# Patient Record
(demographics unavailable — no encounter records)

---

## 2024-12-23 NOTE — RESULTS/DATA
[FreeTextEntry1] : CBC:   	Test  	Result  	Flag	Reference	Goal	Last Verified  	WBC	7.09 K/mm3	 	3.8-10.5		REQUIRED  	RED BLOOD COUNT	4.40 M/ul	 	3.80-5.20		REQUIRED  	HEMOGLOBIN	13.7 g/dL	 	11.5-15.5		REQUIRED  	HEMATOCRIT	40.2 %	 	34.5-45.0		REQUIRED  	MEAN CORPUSCULAR VOLUME	91.4 fL	 	80.0-100.0		REQUIRED  	MEAN CORPUSCULAR HEMOGLOBIN	31.1 pg	 	27.0-34.0		REQUIRED  	MEAN CORPUSCULAR HGB CONC	34.1 g/dl	 	32.0-36.0		REQUIRED  	RED CELL DISTRIBUTION WIDTH	12.9 %	 	10.3-14.5		REQUIRED  	PLATELET COUNT	270 K/uL	 	150-400		REQUIRED  	NEUTROPHILS %	64.3 %	 	43.0-77.0		REQUIRED  	 Differential percentages must be correlated with absolute numbers for clinical significance.    	LYMPHOCYTES %	23.3 %	 	13.0-44.0		REQUIRED  	 Differential percentages must be correlated with absolute numbers for clinical significance.    	MONOCYTES %	9.4 %	 	2.0-14.0		REQUIRED  	 Differential percentages must be correlated with absolute numbers for clinical significance.    	EOSINOPHILS %	2.1 %	 	0.0-6.0		REQUIRED  	 Differential percentages must be correlated with absolute numbers for clinical significance.    	BASOPHILS %	0.6 %	 	0.0-2.0		REQUIRED  	 Differential percentages must be correlated with absolute numbers for clinical significance.    	IMMATURE GRANULOCYTES	0.3 %	 	0.1-0.9		REQUIRED  	AUTOMATED NEUTROPHILS #	4.6 K/uL	 	1.8-7.4		REQUIRED  	LYMPHOCYTES #	1.7 K/uL	 	1.0-3.3		REQUIRED  	MONOCYTES #	0.7 K/uL	 	0.0-0.9		REQUIRED  	EOSINOPHILS #	0.2 K/uL	 	0.0-0.5		REQUIRED  	BASOPHILS #	0.0 K/uL	 	0.0-0.2		REQUIRED  	NUCLEATED RBC	0 %	 	0		REQUIRED

## 2024-12-23 NOTE — REVIEW OF SYSTEMS
[FreeTextEntry9] : hands [Diarrhea: Grade 0] : Diarrhea: Grade 0 [Joint Pain] : no joint pain [Negative] : Allergic/Immunologic

## 2024-12-23 NOTE — PHYSICAL EXAM
[Restricted in physically strenuous activity but ambulatory and able to carry out work of a light or sedentary nature] : Status 1- Restricted in physically strenuous activity but ambulatory and able to carry out work of a light or sedentary nature, e.g., light house work, office work [Normal] : grossly intact [de-identified] : dense breasts. No palpable masses. well healed lumpectomy scar on right breast.

## 2024-12-23 NOTE — HISTORY OF PRESENT ILLNESS
[Disease: _____________________] : Disease: [unfilled] [AJCC Stage: ____] : AJCC Stage: [unfilled] [de-identified] : 79 year old female being seen for a Status post right breast partial mastectomy and sentinel lymph node biopsy performed on February 23, 2023.  The patient is a postmenopausal white female of [place] descent with no family history of breast or ovarian cancer who was found to have an abnormal screening mammography in December 2022 with a suspicious 1.5 cm 12:00 density seen under ultrasound.   Ultrasound core biopsy showed a classical type invasive lobular cancer which was ER/DE positive HER2 negative by IHC  MRI was performed on February 2, 2023 showing the localized cancer in the right breast 12:00 region and she underwent a right breast partial mastectomy with Hologic localization and sentinel lymph node biopsy on February 23, 2023.   Final pathology showed a 1.5 cm classical type intermediate grade ILC with free margins and 2 negative sentinel lymph nodes making this a pathologic prognostic stage IA breast cancer.   [de-identified] : 12/23/24- here for follow up. Denies any complaints. Feels overall well.  No complaints.   No pain- appetite good.  Enjoying life.   Reports having had a ghazal and US on 12/12/24, which was negative.   Continues to take anastrozole without issues   [0 - No Distress] : Distress Level: 0 [ECOG Performance Status: 1 - Restricted in physically strenuous activity but ambulatory and able to carry out work of a light or sedentary nature] : Performance Status: 1 - Restricted in physically strenuous activity but ambulatory and able to carry out work of a light or sedentary nature, e.g., light house work, office work

## 2024-12-23 NOTE — PHYSICAL EXAM
[Restricted in physically strenuous activity but ambulatory and able to carry out work of a light or sedentary nature] : Status 1- Restricted in physically strenuous activity but ambulatory and able to carry out work of a light or sedentary nature, e.g., light house work, office work [Normal] : grossly intact [de-identified] : dense breasts. No palpable masses. well healed lumpectomy scar on right breast.

## 2024-12-23 NOTE — DISEASE MANAGEMENT
[Pathological] : TNM Stage: p [IA] : IA [TTNM] : 1c [NTNM] : 0 [MTNM] : 0 [de-identified] : completed 16/16 fractions to a dose of 4240 cGy right breast finished 5/9/23

## 2024-12-23 NOTE — PHYSICAL EXAM
[] : no respiratory distress [Respiration, Rhythm And Depth] : normal respiratory rhythm and effort [Exaggerated Use Of Accessory Muscles For Inspiration] : no accessory muscle use [Abdomen Soft] : soft [Nondistended] : nondistended [Abdomen Tenderness] : non-tender [Supraclavicular Lymph Nodes Enlarged Bilaterally] : supraclavicular [Axillary Lymph Nodes Enlarged Bilaterally] : axillary [Normal] : oriented to person, place and time, the affect was normal, the mood was normal and not anxious [de-identified] : symmetric in size; there is a small soft tissue defect/dimpling at the site of the tumor bed in the right breast; the right breast feels slightly more firm and less supple than the left breast c/w post-Tx effect [de-identified] : no skin changes w/in RT field

## 2024-12-23 NOTE — REVIEW OF SYSTEMS
[Negative] : Allergic/Immunologic [Fatigue: Grade 0] : Fatigue: Grade 0 [Pruritus: Grade 0] : Pruritus: Grade 0 [Skin Hyperpigmentation: Grade 0] : Skin Hyperpigmentation: Grade 0 [Dermatitis Radiation: Grade 0] : Dermatitis Radiation: Grade 0 [Breast Pain: Grade 0] : Breast Pain: Grade 0

## 2024-12-23 NOTE — PHYSICAL EXAM
[] : no respiratory distress [Respiration, Rhythm And Depth] : normal respiratory rhythm and effort [Exaggerated Use Of Accessory Muscles For Inspiration] : no accessory muscle use [Abdomen Soft] : soft [Nondistended] : nondistended [Abdomen Tenderness] : non-tender [Supraclavicular Lymph Nodes Enlarged Bilaterally] : supraclavicular [Axillary Lymph Nodes Enlarged Bilaterally] : axillary [Normal] : oriented to person, place and time, the affect was normal, the mood was normal and not anxious [de-identified] : symmetric in size; there is a small soft tissue defect/dimpling at the site of the tumor bed in the right breast; the right breast feels slightly more firm and less supple than the left breast c/w post-Tx effect [de-identified] : no skin changes w/in RT field

## 2024-12-23 NOTE — HISTORY OF PRESENT ILLNESS
[Disease: _____________________] : Disease: [unfilled] [AJCC Stage: ____] : AJCC Stage: [unfilled] [de-identified] : 79 year old female being seen for a Status post right breast partial mastectomy and sentinel lymph node biopsy performed on February 23, 2023.  The patient is a postmenopausal white female of [place] descent with no family history of breast or ovarian cancer who was found to have an abnormal screening mammography in December 2022 with a suspicious 1.5 cm 12:00 density seen under ultrasound.   Ultrasound core biopsy showed a classical type invasive lobular cancer which was ER/MD positive HER2 negative by IHC  MRI was performed on February 2, 2023 showing the localized cancer in the right breast 12:00 region and she underwent a right breast partial mastectomy with Hologic localization and sentinel lymph node biopsy on February 23, 2023.   Final pathology showed a 1.5 cm classical type intermediate grade ILC with free margins and 2 negative sentinel lymph nodes making this a pathologic prognostic stage IA breast cancer.   [de-identified] : 12/23/24- here for follow up. Denies any complaints. Feels overall well.  No complaints.   No pain- appetite good.  Enjoying life.   Reports having had a ghazal and US on 12/12/24, which was negative.   Continues to take anastrozole without issues   [0 - No Distress] : Distress Level: 0 [ECOG Performance Status: 1 - Restricted in physically strenuous activity but ambulatory and able to carry out work of a light or sedentary nature] : Performance Status: 1 - Restricted in physically strenuous activity but ambulatory and able to carry out work of a light or sedentary nature, e.g., light house work, office work

## 2024-12-23 NOTE — HISTORY OF PRESENT ILLNESS
[FreeTextEntry1] : Ms. Batista is a 79-year-old female with Stage IA IDC of the right breast s/p PMx with negative margins and negative SLNBx. She completed 16/16 fractions to a dose of 4240 cGy right breast finished 5/9/23, presenting today for follow-up.  She started Anastrozole on 6/16/23 and is doing very well on it, she is scheduled for a follow-up with Dr. Lewis today.  Maris/Sono due 12/12/2024 revealed no mammographic or sonographic evidence of malignancy.  She continues to follow-up with Dr. Rouse whom she will be seeing in February 2025.  12/23/2024 Ms. Batista presents today for her follow up.  She denies any issues/concerns or any breast pain. She is doing well overall.

## 2024-12-23 NOTE — DISEASE MANAGEMENT
[Pathological] : TNM Stage: p [IA] : IA [TTNM] : 1c [NTNM] : 0 [MTNM] : 0 [de-identified] : completed 16/16 fractions to a dose of 4240 cGy right breast finished 5/9/23

## 2024-12-23 NOTE — ASSESSMENT
[FreeTextEntry1] : pT1c, N0 ILC, G2, s/p lumpectomy/SLNBx on 2/23/23 Stage IA  Low risk mammaprint--luminal A  Invitae neg.  s/p RT --5/9/23  anastrozole 6/16/23  BMD: 3/28/23 --osteopenia  Seen by Ayesha in 4/2024 who recommended repeat DEXA in 2 years and determine if needs bisphosphonate at that time   Plan: >continue anastrozole 1 mg daily. Refill sent >continiue oscal _+ D >mammo/sono annually >colonoscopy -- due --arrange via PMD >continue to follow up with Dr. Rouse  >follow up with endocrinology   RTC 4 months.

## 2025-01-13 NOTE — REASON FOR VISIT
[Follow-Up: _____] : a [unfilled] follow-up visit [FreeTextEntry1] : The patient is a postmenopausal white female of [place] descent with no family history of breast or ovarian cancer who was found to have an abnormal screening mammography in December 2022 with a suspicious 1.5 cm 12:00 density seen under ultrasound.  Ultrasound core biopsy showed a classical type invasive lobular cancer which was ER/PA positive HER2 negative by IHC.  MRI was performed on February 2, 2023 showing the localized cancer in the right breast 12:00 region and she underwent a right breast partial mastectomy with Hologic localization and sentinel lymph node biopsy on February 23, 2023.  Final pathology showed a 1.5 cm classical type intermediate grade invasive lobular cancer with free margins and 2 negative sentinel lymph nodes making this a pathologic prognostic stage IA breast cancer.  She underwent external beam radiation with Dr. Casas and was placed on Arimidex by Dr. Helms.  She comes in now for routine follow-up. [FreeTextEntry2] : February 23, 2023

## 2025-01-13 NOTE — HISTORY OF PRESENT ILLNESS
[FreeTextEntry1] : The patient is a 79-year-old G1, P1 postmenopausal white female of [place] and [place] descent.  She underwent menarche at age 12 and had her first child at age 31.  She underwent menopause in her late 50s and never took any hormone replacement therapy.  She has no family history of breast or ovarian cancer.  Her brother passed away from esophageal cancer and her maternal grandfather had leukemia.  She has a past medical history significant for A. fib for which she is on Eliquis and she also has hypertension and prediabetes for which she takes metformin.  She underwent a screening mammography and ultrasound at [facility] on December 10, 2022 which showed a vague but spiculated density in the right breast 12:00 region which persisted on compression views and was seen as an irregular density 11 cm from the nipple on ultrasound measuring 1.5 cm.  Ultrasound-guided core biopsy was performed on January 23, 2023 which showed a classical type invasive lobular cancer which was 11 cm from the nipple and was ER strongly positive at 100%, LA moderately positive at 5%, HER2/carla negative by IHC with a Ki-67 of 20%.  She underwent genetic panel testing in April 2023 which was negative.  MRI was performed on February 2, 2023 at [place] showing the known cancer in the right breast slightly upper inner quadrant measuring 1.8 x 1.7 x 8.9 cm with some slight enhancement superior and anterior with a 5 mm density.  This separate finding was close to the primary cancer and she underwent a Hologic localization and then a partial mastectomy and sentinel lymph node biopsy on February 23, 2023.   The final pathology showed a 1.5 cm classical type intermediate grade invasive lobular cancer with free margins and 2 negative right axillary sentinel lymph nodes making this a pathologic prognostic stage IA breast cancer.  She was seen by Dr. Helms and Dr. Casas and received a 3-week course of external beam radiation which finished in May 2023 and she was placed on Arimidex and she now follows up with Dr. Lewis.  She comes in now for routine follow-up.

## 2025-01-13 NOTE — PHYSICAL EXAM
[Normocephalic] : normocephalic [Atraumatic] : atraumatic [EOMI] : extra ocular movement intact [Supple] : supple [No Supraclavicular Adenopathy] : no supraclavicular adenopathy [No Cervical Adenopathy] : no cervical adenopathy [Examined in the supine and seated position] : examined in the supine and seated position [No dominant masses] : no dominant masses in right breast  [No dominant masses] : no dominant masses left breast [No Nipple Retraction] : no left nipple retraction [No Nipple Discharge] : no left nipple discharge [Breast Mass Right Breast ___cm] : no masses [Breast Mass Left Breast ___cm] : no masses [Breast Nipple Inversion] : nipples not inverted [Breast Nipple Retraction] : nipples not retracted [Breast Nipple Flattening] : nipples not flattened [Breast Nipple Fissures] : nipples not fissured [Breast Abnormal Lactation (Galactorrhea)] : no galactorrhea [Breast Abnormal Secretion Bloody Fluid] : no bloody discharge [Breast Abnormal Secretion Serous Fluid] : no serous discharge [Breast Abnormal Secretion Opalescent Fluid] : no milky discharge [No Axillary Lymphadenopathy] : no left axillary lymphadenopathy [No Edema] : no edema [No Rashes] : no rashes [No Ulceration] : no ulceration [de-identified] : On exam, the patient is doing well after her right breast partial mastectomy and sentinel lymph node biopsy.  Her wound is healing well with no signs of any hematoma or infection.  I did aspirate about 7 cc's of bloody seroma from underneath the wide excision site today. [de-identified] : Status post right breast partial mastectomy and wound is healing well.  I aspirated 7 cc's of bloody seroma from underneath the wide excision site under ultrasound guidance in the office today.

## 2025-01-13 NOTE — ASSESSMENT
[FreeTextEntry1] : The patient is a 79-year-old G1, P1 postmenopausal white female of [place] and [place] descent. She underwent menarche at age 12 and had her first child at age 31. She underwent menopause in her late 50s and never took any hormone replacement therapy. She has no family history of breast or ovarian cancer. Her brother passed away from esophageal cancer and her maternal grandfather had leukemia. She has a past medical history significant for A. fib for which she is on Eliquis and she also has hypertension and prediabetes for which she takes metformin. She underwent a screening mammography and ultrasound at [facility] on December 10, 2022 which showed a vague but spiculated density in the right breast 12:00 region which persisted on compression views and was seen as an irregular density 11 cm from the nipple on ultrasound measuring 1.5 cm. Ultrasound-guided core biopsy was performed on January 23, 2023 which showed a classical type invasive lobular cancer which was 11 cm from the nipple and was ER strongly positive at 100%, SD moderately positive at 5%, HER2/carla negative by IHC with a Ki-67 of 20%. She had a "coil" clip placed at the biopsy site. I reviewed her imaging from [facility] which she brought in on CD-ROM. This was a fairly vague area seen on mammography but easily seen on ultrasound measuring 1.5 cm. Pathology was reviewed from [facility] showing a classical type invasive lobular cancer. She underwent genetic panel testing in April 2023 which was negative.. MRI was performed on February 2, 2023 at [place] showing the known cancer in the right breast slightly upper inner quadrant measuring 1.8 x 1.7 x 8.9 cm with some slight enhancement superior and anterior with a 5 mm density. This separate finding was close to the primary cancer and she underwent a Hologic localization and then a partial mastectomy and sentinel lymph node biopsy on February 23, 2023. She did have a cardiology consult with her cardiologist, Nasrin Bush, preoperatively and was taken off her Eliquis prior to surgery. The final pathology showed a 1.5 cm classical type intermediate grade invasive lobular cancer with free margins and 2 negative right axillary sentinel lymph nodes making this a pathologic prognostic stage IA breast cancer. She was seen by Dr. Helms and Dr. Casas postoperatively and received a 3-week course of external beam radiation to the right breast which finished on May 9, 2023 and she was then placed on anastrozole.  She underwent her last bilateral mammography and ultrasound which was reviewed from December 12, 2024 and performed at [facility] which showed postsurgical changes in the right breast but no suspicious findings.  On exam today, she has healed well from her right breast partial mastectomy and sentinel lymph node biopsy and has no evidence of recurrence. I would like to see her again in another 6 months around August 2025. Her next routine bilateral mammography and ultrasound will be due again in December 2025 and she was given prescriptions.  She will remain on Arimidex and she is now following up with Dr. Lewis since Dr. Helms has left.

## 2025-01-13 NOTE — ADDENDUM
[FreeTextEntry1] : I spent greater than 75% in consultation in face-to-face counseling and coordination of care in this patient with a history of a right breast cancer who underwent a partial mastectomy and comes in now for routine breast cancer screening/surveillance.

## 2025-02-04 NOTE — ADDENDUM
[FreeTextEntry1] : I spent greater than 75% in consultation in face-to-face counseling and coordination of care in this patient with a history of a right breast cancer who underwent a partial mastectomy and comes in now for routine breast cancer screening/surveillance. 04-Jan-2025 14:36

## 2025-02-04 NOTE — PHYSICAL EXAM
[Normocephalic] : normocephalic [Atraumatic] : atraumatic [EOMI] : extra ocular movement intact [Supple] : supple [No Supraclavicular Adenopathy] : no supraclavicular adenopathy [No Cervical Adenopathy] : no cervical adenopathy [Examined in the supine and seated position] : examined in the supine and seated position [No dominant masses] : no dominant masses in right breast  [No dominant masses] : no dominant masses left breast [No Nipple Retraction] : no left nipple retraction [No Nipple Discharge] : no left nipple discharge [Breast Mass Right Breast ___cm] : no masses [Breast Mass Left Breast ___cm] : no masses [Breast Nipple Inversion] : nipples not inverted [Breast Nipple Retraction] : nipples not retracted [Breast Nipple Flattening] : nipples not flattened [Breast Nipple Fissures] : nipples not fissured [Breast Abnormal Lactation (Galactorrhea)] : no galactorrhea [Breast Abnormal Secretion Bloody Fluid] : no bloody discharge [Breast Abnormal Secretion Serous Fluid] : no serous discharge [Breast Abnormal Secretion Opalescent Fluid] : no milky discharge [No Axillary Lymphadenopathy] : no left axillary lymphadenopathy [No Edema] : no edema [No Rashes] : no rashes [No Ulceration] : no ulceration [de-identified] : On exam, the patient is doing well after her right breast partial mastectomy and sentinel lymph node biopsy.  Her wound is healing well with no signs of any hematoma or infection.  I did aspirate about 7 cc's of bloody seroma from underneath the wide excision site today. [de-identified] : Status post right breast partial mastectomy and wound is healing well.  I aspirated 7 cc's of bloody seroma from underneath the wide excision site under ultrasound guidance in the office today.

## 2025-02-04 NOTE — REASON FOR VISIT
[Follow-Up: _____] : a [unfilled] follow-up visit [FreeTextEntry1] : The patient is a postmenopausal white female of [place] descent with no family history of breast or ovarian cancer who was found to have an abnormal screening mammography in December 2022 with a suspicious 1.5 cm 12:00 density seen under ultrasound.  Ultrasound core biopsy showed a classical type invasive lobular cancer which was ER/IA positive HER2 negative by IHC.  MRI was performed on February 2, 2023 showing the localized cancer in the right breast 12:00 region and she underwent a right breast partial mastectomy with Hologic localization and sentinel lymph node biopsy on February 23, 2023.  Final pathology showed a 1.5 cm classical type intermediate grade invasive lobular cancer with free margins and 2 negative sentinel lymph nodes making this a pathologic prognostic stage IA breast cancer.  She underwent external beam radiation with Dr. Casas and was placed on Arimidex by Dr. Helms.  She comes in now for routine follow-up. [FreeTextEntry2] : February 23, 2023

## 2025-02-04 NOTE — HISTORY OF PRESENT ILLNESS
[FreeTextEntry1] : The patient is a 79-year-old G1, P1 postmenopausal white female of Michelle and [place] descent.  She underwent menarche at age 12 and had her first child at age 31.  She underwent menopause in her late 50s and never took any hormone replacement therapy.  She has no family history of breast or ovarian cancer.  Her brother passed away from esophageal cancer and her maternal grandfather had leukemia.  She has a past medical history significant for A. fib for which she is on Eliquis and she also has hypertension and prediabetes for which she takes metformin.  She underwent a screening mammography and ultrasound at [facility] on December 10, 2022 which showed a vague but spiculated density in the right breast 12:00 region which persisted on compression views and was seen as an irregular density 11 cm from the nipple on ultrasound measuring 1.5 cm.  Ultrasound-guided core biopsy was performed on January 23, 2023 which showed a classical type invasive lobular cancer which was 11 cm from the nipple and was ER strongly positive at 100%, LA moderately positive at 5%, HER2/carla negative by IHC with a Ki-67 of 20%.  She underwent genetic panel testing in April 2023 which was negative.  MRI was performed on February 2, 2023 at [place] showing the known cancer in the right breast slightly upper inner quadrant measuring 1.8 x 1.7 x 8.9 cm with some slight enhancement superior and anterior with a 5 mm density.  This separate finding was close to the primary cancer and she underwent a Hologic localization and then a partial mastectomy and sentinel lymph node biopsy on February 23, 2023.   The final pathology showed a 1.5 cm classical type intermediate grade invasive lobular cancer with free margins and 2 negative right axillary sentinel lymph nodes making this a pathologic prognostic stage IA breast cancer.  She was seen by Dr. Helms and Dr. Casas and received a 3-week course of external beam radiation which finished in May 2023 and she was placed on Arimidex and she now follows up with Dr. Lewis.  She comes in now for routine follow-up.

## 2025-02-04 NOTE — REASON FOR VISIT
[Follow-Up: _____] : a [unfilled] follow-up visit [FreeTextEntry1] : The patient is a postmenopausal white female of [place] descent with no family history of breast or ovarian cancer who was found to have an abnormal screening mammography in December 2022 with a suspicious 1.5 cm 12:00 density seen under ultrasound.  Ultrasound core biopsy showed a classical type invasive lobular cancer which was ER/IA positive HER2 negative by IHC.  MRI was performed on February 2, 2023 showing the localized cancer in the right breast 12:00 region and she underwent a right breast partial mastectomy with Hologic localization and sentinel lymph node biopsy on February 23, 2023.  Final pathology showed a 1.5 cm classical type intermediate grade invasive lobular cancer with free margins and 2 negative sentinel lymph nodes making this a pathologic prognostic stage IA breast cancer.  She underwent external beam radiation with Dr. Casas and was placed on Arimidex by Dr. Helms.  She comes in now for routine follow-up. [FreeTextEntry2] : February 23, 2023

## 2025-02-04 NOTE — ADDENDUM
[FreeTextEntry1] : I spent greater than 75% in consultation in face-to-face counseling and coordination of care in this patient with a history of a right breast cancer who underwent a partial mastectomy and comes in now for routine breast cancer screening/surveillance. clear

## 2025-02-04 NOTE — HISTORY OF PRESENT ILLNESS
[FreeTextEntry1] : The patient is a 79-year-old G1, P1 postmenopausal white female of Michelle and [place] descent.  She underwent menarche at age 12 and had her first child at age 31.  She underwent menopause in her late 50s and never took any hormone replacement therapy.  She has no family history of breast or ovarian cancer.  Her brother passed away from esophageal cancer and her maternal grandfather had leukemia.  She has a past medical history significant for A. fib for which she is on Eliquis and she also has hypertension and prediabetes for which she takes metformin.  She underwent a screening mammography and ultrasound at [facility] on December 10, 2022 which showed a vague but spiculated density in the right breast 12:00 region which persisted on compression views and was seen as an irregular density 11 cm from the nipple on ultrasound measuring 1.5 cm.  Ultrasound-guided core biopsy was performed on January 23, 2023 which showed a classical type invasive lobular cancer which was 11 cm from the nipple and was ER strongly positive at 100%, HI moderately positive at 5%, HER2/carla negative by IHC with a Ki-67 of 20%.  She underwent genetic panel testing in April 2023 which was negative.  MRI was performed on February 2, 2023 at [place] showing the known cancer in the right breast slightly upper inner quadrant measuring 1.8 x 1.7 x 8.9 cm with some slight enhancement superior and anterior with a 5 mm density.  This separate finding was close to the primary cancer and she underwent a Hologic localization and then a partial mastectomy and sentinel lymph node biopsy on February 23, 2023.   The final pathology showed a 1.5 cm classical type intermediate grade invasive lobular cancer with free margins and 2 negative right axillary sentinel lymph nodes making this a pathologic prognostic stage IA breast cancer.  She was seen by Dr. Helms and Dr. Casas and received a 3-week course of external beam radiation which finished in May 2023 and she was placed on Arimidex and she now follows up with Dr. Lewis.  She comes in now for routine follow-up.

## 2025-02-04 NOTE — ASSESSMENT
[FreeTextEntry1] : The patient is a 79-year-old G1, P1 postmenopausal white female of [place] and [place] descent. She underwent menarche at age 12 and had her first child at age 31. She underwent menopause in her late 50s and never took any hormone replacement therapy. She has no family history of breast or ovarian cancer. Her brother passed away from esophageal cancer and her maternal grandfather had leukemia. She has a past medical history significant for A. fib for which she is on Eliquis and she also has hypertension and prediabetes for which she takes metformin. She underwent a screening mammography and ultrasound at [facility] on December 10, 2022 which showed a vague but spiculated density in the right breast 12:00 region which persisted on compression views and was seen as an irregular density 11 cm from the nipple on ultrasound measuring 1.5 cm. Ultrasound-guided core biopsy was performed on January 23, 2023 which showed a classical type invasive lobular cancer which was 11 cm from the nipple and was ER strongly positive at 100%, CO moderately positive at 5%, HER2/carla negative by IHC with a Ki-67 of 20%. She had a "coil" clip placed at the biopsy site. I reviewed her imaging from [facility] which she brought in on CD-ROM. This was a fairly vague area seen on mammography but easily seen on ultrasound measuring 1.5 cm. Pathology was reviewed from [facility] showing a classical type invasive lobular cancer. She underwent genetic panel testing in April 2023 which was negative.. MRI was performed on February 2, 2023 at [place] showing the known cancer in the right breast slightly upper inner quadrant measuring 1.8 x 1.7 x 8.9 cm with some slight enhancement superior and anterior with a 5 mm density. This separate finding was close to the primary cancer and she underwent a Hologic localization and then a partial mastectomy and sentinel lymph node biopsy on February 23, 2023. She did have a cardiology consult with her cardiologist, Nasrin Bush, preoperatively and was taken off her Eliquis prior to surgery. The final pathology showed a 1.5 cm classical type intermediate grade invasive lobular cancer with free margins and 2 negative right axillary sentinel lymph nodes making this a pathologic prognostic stage IA breast cancer. She was seen by Dr. Helms and Dr. Cassa postoperatively and received a 3-week course of external beam radiation to the right breast which finished on May 9, 2023 and she was then placed on anastrozole.  She underwent her last bilateral mammography and ultrasound which was reviewed from December 12, 2024 and performed at [facility] which showed postsurgical changes in the right breast but no suspicious findings.  On exam today, she has healed well from her right breast partial mastectomy and sentinel lymph node biopsy and has no evidence of recurrence. I would like to see her again in another 6 months around August 2025. Her next routine bilateral mammography and ultrasound will be due again in December 2025 and she was given prescriptions.  She will remain on Arimidex and she is now following up with Dr. Lewis since Dr. Helms has left. Pt remained hemodynamically stable.  Pt denies any pain or shortness of breath. Pt being discharged to home. Pt understands all discharge instructions and medication.

## 2025-02-04 NOTE — PHYSICAL EXAM
[Normocephalic] : normocephalic [Atraumatic] : atraumatic [EOMI] : extra ocular movement intact [Supple] : supple [No Supraclavicular Adenopathy] : no supraclavicular adenopathy [No Cervical Adenopathy] : no cervical adenopathy [Examined in the supine and seated position] : examined in the supine and seated position [No dominant masses] : no dominant masses in right breast  [No dominant masses] : no dominant masses left breast [No Nipple Retraction] : no left nipple retraction [No Nipple Discharge] : no left nipple discharge [Breast Mass Right Breast ___cm] : no masses [Breast Mass Left Breast ___cm] : no masses [Breast Nipple Inversion] : nipples not inverted [Breast Nipple Retraction] : nipples not retracted [Breast Nipple Flattening] : nipples not flattened [Breast Nipple Fissures] : nipples not fissured [Breast Abnormal Lactation (Galactorrhea)] : no galactorrhea [Breast Abnormal Secretion Bloody Fluid] : no bloody discharge [Breast Abnormal Secretion Serous Fluid] : no serous discharge [Breast Abnormal Secretion Opalescent Fluid] : no milky discharge [No Axillary Lymphadenopathy] : no left axillary lymphadenopathy [No Edema] : no edema [No Rashes] : no rashes [No Ulceration] : no ulceration [de-identified] : On exam, the patient is doing well after her right breast partial mastectomy and sentinel lymph node biopsy.  Her wound is healing well with no signs of any hematoma or infection.  I did aspirate about 7 cc's of bloody seroma from underneath the wide excision site today. [de-identified] : Status post right breast partial mastectomy and wound is healing well.  I aspirated 7 cc's of bloody seroma from underneath the wide excision site under ultrasound guidance in the office today.

## 2025-04-22 NOTE — PHYSICAL EXAM
[Restricted in physically strenuous activity but ambulatory and able to carry out work of a light or sedentary nature] : Status 1- Restricted in physically strenuous activity but ambulatory and able to carry out work of a light or sedentary nature, e.g., light house work, office work [Normal] : grossly intact [de-identified] : dense breasts. No palpable masses. well healed lumpectomy scar on right breast.

## 2025-04-22 NOTE — PHYSICAL EXAM
[Restricted in physically strenuous activity but ambulatory and able to carry out work of a light or sedentary nature] : Status 1- Restricted in physically strenuous activity but ambulatory and able to carry out work of a light or sedentary nature, e.g., light house work, office work [Normal] : grossly intact [de-identified] : dense breasts. No palpable masses. well healed lumpectomy scar on right breast.

## 2025-04-22 NOTE — REVIEW OF SYSTEMS
[Diarrhea: Grade 0] : Diarrhea: Grade 0 [Negative] : Allergic/Immunologic [Hot Flashes] : hot flashes [Joint Pain] : no joint pain

## 2025-04-22 NOTE — HISTORY OF PRESENT ILLNESS
[Disease: _____________________] : Disease: [unfilled] [AJCC Stage: ____] : AJCC Stage: [unfilled] [0 - No Distress] : Distress Level: 0 [ECOG Performance Status: 1 - Restricted in physically strenuous activity but ambulatory and able to carry out work of a light or sedentary nature] : Performance Status: 1 - Restricted in physically strenuous activity but ambulatory and able to carry out work of a light or sedentary nature, e.g., light house work, office work [de-identified] : 79 year old female being seen for a Status post right breast partial mastectomy and sentinel lymph node biopsy performed on February 23, 2023.  The patient is a postmenopausal white female of [place] descent with no family history of breast or ovarian cancer who was found to have an abnormal screening mammography in December 2022 with a suspicious 1.5 cm 12:00 density seen under ultrasound.   Ultrasound core biopsy showed a classical type invasive lobular cancer which was ER/AL positive HER2 negative by IHC  MRI was performed on February 2, 2023 showing the localized cancer in the right breast 12:00 region and she underwent a right breast partial mastectomy with Hologic localization and sentinel lymph node biopsy on February 23, 2023.   Final pathology showed a 1.5 cm classical type intermediate grade ILC with free margins and 2 negative sentinel lymph nodes making this a pathologic prognostic stage IA breast cancer.   [de-identified] : 4/22/25 Denies any complaints. Feels overall well.  No complaints.   No pain- appetite good.  Enjoying life.   Reports having had a ghazal and US on 12/12/24, which was negative.   Continues to take anastrozole without issues

## 2025-04-22 NOTE — ASSESSMENT
[FreeTextEntry1] : pT1c, N0 ILC, G2, s/p lumpectomy/SLNBx on 2/23/23 Stage IA  Low risk mammaprint--luminal A  Invitae neg.  s/p RT --5/9/23  anastrozole 6/16/23  BMD: 3/28/23 --osteopenia  Seen by Endo in 4/2024 who recommended repeat DEXA in 2 years and determine if needs bisphosphonate at that time  Plan: >continue anastrozole 1 mg daily. Refill sent >continiue oscal _+ D >mammo/sono annually- next due Dec 2025 >repeat DEXA scan. last in 2023 w/ osteoporosis. ordered by Endo and to be scheduled. recommend Bisphophonate. Looking into Reclast with Endo >continue to follow up with Dr. Rouse  >follow up with endocrinology >colonoscopy -- due --arrange via PMD  RTC 4 months.

## 2025-04-22 NOTE — HISTORY OF PRESENT ILLNESS
[Disease: _____________________] : Disease: [unfilled] [AJCC Stage: ____] : AJCC Stage: [unfilled] [0 - No Distress] : Distress Level: 0 [ECOG Performance Status: 1 - Restricted in physically strenuous activity but ambulatory and able to carry out work of a light or sedentary nature] : Performance Status: 1 - Restricted in physically strenuous activity but ambulatory and able to carry out work of a light or sedentary nature, e.g., light house work, office work [de-identified] : 79 year old female being seen for a Status post right breast partial mastectomy and sentinel lymph node biopsy performed on February 23, 2023.  The patient is a postmenopausal white female of [place] descent with no family history of breast or ovarian cancer who was found to have an abnormal screening mammography in December 2022 with a suspicious 1.5 cm 12:00 density seen under ultrasound.   Ultrasound core biopsy showed a classical type invasive lobular cancer which was ER/NH positive HER2 negative by IHC  MRI was performed on February 2, 2023 showing the localized cancer in the right breast 12:00 region and she underwent a right breast partial mastectomy with Hologic localization and sentinel lymph node biopsy on February 23, 2023.   Final pathology showed a 1.5 cm classical type intermediate grade ILC with free margins and 2 negative sentinel lymph nodes making this a pathologic prognostic stage IA breast cancer.   [de-identified] : 4/22/25 Denies any complaints. Feels overall well.  No complaints.   No pain- appetite good.  Enjoying life.   Reports having had a ghazal and US on 12/12/24, which was negative.   Continues to take anastrozole without issues

## 2025-06-09 NOTE — HISTORY OF PRESENT ILLNESS
[FreeTextEntry1] : Ms. Batista is a 79-year-old female with Stage IA IDC of the right breast s/p PMx with negative margins and negative SLNBx. She completed 16/16 fractions to a dose of 4240 cGy right breast finished 5/9/23, presenting today for follow-up. Maris/Sono due 12/12/2024 revealed no mammographic or sonographic evidence of malignancy. She continues to follow-up with Dr. Rouse whom she saw on 2/11/25.  She will see him again in August 2025 She started Anastrozole on 6/16/23 and is doing very well on it, She saw Dr Lewis on 4/22/25 and will see her again in August 2025.    Today 6/20/25 she returns for a follow up

## 2025-06-09 NOTE — DISEASE MANAGEMENT
[Pathological] : TNM Stage: p [TTNM] : 1c [NTNM] : 0 [MTNM] : 0 [IA] : IA [de-identified] : completed 16/16 fractions to a dose of 4240 cGy right breast finished 5/9/23

## 2025-06-20 NOTE — PHYSICAL EXAM
[] : no respiratory distress [Respiration, Rhythm And Depth] : normal respiratory rhythm and effort [Exaggerated Use Of Accessory Muscles For Inspiration] : no accessory muscle use [Abdomen Soft] : soft [Nondistended] : nondistended [Abdomen Tenderness] : non-tender [Supraclavicular Lymph Nodes Enlarged Bilaterally] : supraclavicular [Axillary Lymph Nodes Enlarged Bilaterally] : axillary [Normal] : oriented to person, place and time, the affect was normal, the mood was normal and not anxious [de-identified] : pendulous breasts, symmetric in size and shape; the treated right breast is slightly more firm centrally in the region of the tumor bed c/w post-RT fibrosis; no suspicious masses [de-identified] : s/p recent skin biopsy on left CW, site is healing

## 2025-06-20 NOTE — PHYSICAL EXAM
[] : no respiratory distress [Respiration, Rhythm And Depth] : normal respiratory rhythm and effort [Exaggerated Use Of Accessory Muscles For Inspiration] : no accessory muscle use [Abdomen Soft] : soft [Nondistended] : nondistended [Abdomen Tenderness] : non-tender [Supraclavicular Lymph Nodes Enlarged Bilaterally] : supraclavicular [Axillary Lymph Nodes Enlarged Bilaterally] : axillary [Normal] : oriented to person, place and time, the affect was normal, the mood was normal and not anxious [de-identified] : pendulous breasts, symmetric in size and shape; the treated right breast is slightly more firm centrally in the region of the tumor bed c/w post-RT fibrosis; no suspicious masses [de-identified] : s/p recent skin biopsy on left CW, site is healing

## 2025-06-20 NOTE — HISTORY OF PRESENT ILLNESS
[FreeTextEntry1] : Ms. Batista is a 79-year-old female with Stage IA IDC of the right breast s/p PMx with negative margins and negative SLNBx. She completed 16/16 fractions to a dose of 4240 cGy right breast finished 5/9/23, presenting today for follow-up.  Maris/Sono due 12/12/2024 revealed no mammographic or sonographic evidence of malignancy.  She continues to follow-up with Dr. Rouse whom she saw on 2/11/25.  She will see him again in August 2025.  She started Anastrozole on 6/16/23 and is doing very well on it. She has a scheduled follow-up with Dr. Lewis on 8/12/25.  06/20/25 Ms. Batista presents today for follow-up. She is doing very well; denies any breast pain, erythema, or ROM difficulties.

## 2025-06-20 NOTE — REVIEW OF SYSTEMS
[Negative] : Allergic/Immunologic [Edema Limbs: Grade 0] : Edema Limbs: Grade 0  [Fatigue: Grade 0] : Fatigue: Grade 0 [Breast Pain: Grade 0] : Breast Pain: Grade 0 [Pruritus: Grade 0] : Pruritus: Grade 0 [Skin Hyperpigmentation: Grade 0] : Skin Hyperpigmentation: Grade 0 [Dermatitis Radiation: Grade 0] : Dermatitis Radiation: Grade 0 [Fatigue] : no fatigue [Joint Pain] : no joint pain [Muscle Pain] : no muscle pain

## 2025-06-20 NOTE — DISEASE MANAGEMENT
[TTNM] : 1c [NTNM] : 0 [MTNM] : 0 [de-identified] : completed 16/16 fractions to a dose of 4240 cGy right breast finished 5/9/23

## 2025-06-20 NOTE — DISEASE MANAGEMENT
[TTNM] : 1c [NTNM] : 0 [MTNM] : 0 [de-identified] : completed 16/16 fractions to a dose of 4240 cGy right breast finished 5/9/23

## 2025-06-23 NOTE — ASSESSMENT
[FreeTextEntry1] : The patient is a 79-year-old G1, P1 postmenopausal white female of [place] and [place] descent. She underwent menarche at age 12 and had her first child at age 31. She underwent menopause in her late 50s and never took any hormone replacement therapy. She has no family history of breast or ovarian cancer. Her brother passed away from esophageal cancer and her maternal grandfather had leukemia. She has a past medical history significant for A. fib for which she is on Eliquis and she also has hypertension and prediabetes for which she takes metformin. She underwent a screening mammography and ultrasound at [facility] on December 10, 2022 which showed a vague but spiculated density in the right breast 12:00 region which persisted on compression views and was seen as an irregular density 11 cm from the nipple on ultrasound measuring 1.5 cm. Ultrasound-guided core biopsy was performed on January 23, 2023 which showed a classical type invasive lobular cancer which was 11 cm from the nipple and was ER strongly positive at 100%, NE moderately positive at 5%, HER2/carla negative by IHC with a Ki-67 of 20%. She had a "coil" clip placed at the biopsy site. I reviewed her imaging from [facility] which she brought in on CD-ROM. This was a fairly vague area seen on mammography but easily seen on ultrasound measuring 1.5 cm. Pathology was reviewed from [facility] showing a classical type invasive lobular cancer. She underwent genetic panel testing in April 2023 which was negative.. MRI was performed on February 2, 2023 at [place] showing the known cancer in the right breast slightly upper inner quadrant measuring 1.8 x 1.7 x 8.9 cm with some slight enhancement superior and anterior with a 5 mm density. This separate finding was close to the primary cancer and she underwent a Hologic localization and then a partial mastectomy and sentinel lymph node biopsy on February 23, 2023. She did have a cardiology consult with her cardiologist, Nasrin Bush, preoperatively and was taken off her Eliquis prior to surgery. The final pathology showed a 1.5 cm classical type intermediate grade invasive lobular cancer with free margins and 2 negative right axillary sentinel lymph nodes making this a pathologic prognostic stage IA breast cancer. She was seen by Dr. Helms and Dr. Casas postoperatively and received a 3-week course of external beam radiation to the right breast which finished on May 9, 2023 and she was then placed on anastrozole.  She underwent her last bilateral mammography and ultrasound which was reviewed from December 12, 2024 and performed at [facility] which showed postsurgical changes in the right breast but no suspicious findings.  On exam today, she has healed well from her right breast partial mastectomy and sentinel lymph node biopsy and has no evidence of recurrence. I would like to see her again in another 6 months around February 2026. Her next routine bilateral mammography and ultrasound will be due again in December 2025 and she was given prescriptions.  She will remain on Arimidex and she is now following up with Dr. Lewis since Dr. Helms has left.

## 2025-06-23 NOTE — HISTORY OF PRESENT ILLNESS
[FreeTextEntry1] : The patient is a 79-year-old G1, P1 postmenopausal white female of [place] and [place] descent.  She underwent menarche at age 12 and had her first child at age 31.  She underwent menopause in her late 50s and never took any hormone replacement therapy.  She has no family history of breast or ovarian cancer.  Her brother passed away from esophageal cancer and her maternal grandfather had leukemia.  She has a past medical history significant for A. fib for which she is on Eliquis and she also has hypertension and prediabetes for which she takes metformin.  She underwent a screening mammography and ultrasound at [facility] on December 10, 2022 which showed a vague but spiculated density in the right breast 12:00 region which persisted on compression views and was seen as an irregular density 11 cm from the nipple on ultrasound measuring 1.5 cm.  Ultrasound-guided core biopsy was performed on January 23, 2023 which showed a classical type invasive lobular cancer which was 11 cm from the nipple and was ER strongly positive at 100%, IL moderately positive at 5%, HER2/carla negative by IHC with a Ki-67 of 20%.  She underwent genetic panel testing in April 2023 which was negative.  MRI was performed on February 2, 2023 at [place] showing the known cancer in the right breast slightly upper inner quadrant measuring 1.8 x 1.7 x 8.9 cm with some slight enhancement superior and anterior with a 5 mm density.  This separate finding was close to the primary cancer and she underwent a Hologic localization and then a partial mastectomy and sentinel lymph node biopsy on February 23, 2023.   The final pathology showed a 1.5 cm classical type intermediate grade invasive lobular cancer with free margins and 2 negative right axillary sentinel lymph nodes making this a pathologic prognostic stage IA breast cancer.  She was seen by Dr. Helms and Dr. Casas and received a 3-week course of external beam radiation which finished in May 2023 and she was placed on Arimidex and she now follows up with Dr. Lewis.  She comes in now for routine follow-up.

## 2025-06-23 NOTE — PHYSICAL EXAM
[Normocephalic] : normocephalic [Atraumatic] : atraumatic [EOMI] : extra ocular movement intact [Supple] : supple [No Supraclavicular Adenopathy] : no supraclavicular adenopathy [No Cervical Adenopathy] : no cervical adenopathy [Examined in the supine and seated position] : examined in the supine and seated position [No dominant masses] : no dominant masses in right breast  [No dominant masses] : no dominant masses left breast [No Nipple Retraction] : no left nipple retraction [No Nipple Discharge] : no left nipple discharge [Breast Mass Right Breast ___cm] : no masses [Breast Mass Left Breast ___cm] : no masses [Breast Nipple Inversion] : nipples not inverted [Breast Nipple Retraction] : nipples not retracted [Breast Nipple Flattening] : nipples not flattened [Breast Nipple Fissures] : nipples not fissured [Breast Abnormal Lactation (Galactorrhea)] : no galactorrhea [Breast Abnormal Secretion Bloody Fluid] : no bloody discharge [Breast Abnormal Secretion Serous Fluid] : no serous discharge [Breast Abnormal Secretion Opalescent Fluid] : no milky discharge [No Axillary Lymphadenopathy] : no left axillary lymphadenopathy [No Edema] : no edema [No Rashes] : no rashes [No Ulceration] : no ulceration [de-identified] : On exam, the patient is doing well after her right breast partial mastectomy and sentinel lymph node biopsy.  Her wound is healing well with no signs of any hematoma or infection.  I did aspirate about 7 cc's of bloody seroma from underneath the wide excision site today. [de-identified] : Status post right breast partial mastectomy and wound is healing well.  I aspirated 7 cc's of bloody seroma from underneath the wide excision site under ultrasound guidance in the office today.

## 2025-06-23 NOTE — REASON FOR VISIT
[Follow-Up: _____] : a [unfilled] follow-up visit [FreeTextEntry1] : The patient is a postmenopausal white female of [place] descent with no family history of breast or ovarian cancer who was found to have an abnormal screening mammography in December 2022 with a suspicious 1.5 cm 12:00 density seen under ultrasound.  Ultrasound core biopsy showed a classical type invasive lobular cancer which was ER/CT positive HER2 negative by IHC.  MRI was performed on February 2, 2023 showing the localized cancer in the right breast 12:00 region and she underwent a right breast partial mastectomy with Hologic localization and sentinel lymph node biopsy on February 23, 2023.  Final pathology showed a 1.5 cm classical type intermediate grade invasive lobular cancer with free margins and 2 negative sentinel lymph nodes making this a pathologic prognostic stage IA breast cancer.  She underwent external beam radiation with Dr. Caass and was placed on Arimidex by Dr. Helms.  She comes in now for routine follow-up. [FreeTextEntry2] : February 23, 2023